# Patient Record
Sex: MALE | Race: WHITE
[De-identification: names, ages, dates, MRNs, and addresses within clinical notes are randomized per-mention and may not be internally consistent; named-entity substitution may affect disease eponyms.]

---

## 2020-12-17 ENCOUNTER — HOSPITAL ENCOUNTER (INPATIENT)
Dept: HOSPITAL 96 - M.ERS | Age: 60
LOS: 5 days | Discharge: HOME | DRG: 432 | End: 2020-12-22
Attending: INTERNAL MEDICINE | Admitting: INTERNAL MEDICINE
Payer: COMMERCIAL

## 2020-12-17 VITALS — SYSTOLIC BLOOD PRESSURE: 137 MMHG | DIASTOLIC BLOOD PRESSURE: 84 MMHG

## 2020-12-17 VITALS — DIASTOLIC BLOOD PRESSURE: 80 MMHG | SYSTOLIC BLOOD PRESSURE: 122 MMHG

## 2020-12-17 VITALS — SYSTOLIC BLOOD PRESSURE: 148 MMHG | DIASTOLIC BLOOD PRESSURE: 92 MMHG

## 2020-12-17 VITALS — SYSTOLIC BLOOD PRESSURE: 127 MMHG | DIASTOLIC BLOOD PRESSURE: 80 MMHG

## 2020-12-17 VITALS — DIASTOLIC BLOOD PRESSURE: 82 MMHG | SYSTOLIC BLOOD PRESSURE: 115 MMHG

## 2020-12-17 VITALS — HEIGHT: 62.01 IN | WEIGHT: 133.1 LBS | BODY MASS INDEX: 24.18 KG/M2

## 2020-12-17 DIAGNOSIS — E87.6: ICD-10-CM

## 2020-12-17 DIAGNOSIS — K74.69: ICD-10-CM

## 2020-12-17 DIAGNOSIS — K76.6: ICD-10-CM

## 2020-12-17 DIAGNOSIS — Z87.442: ICD-10-CM

## 2020-12-17 DIAGNOSIS — E80.6: ICD-10-CM

## 2020-12-17 DIAGNOSIS — E78.5: ICD-10-CM

## 2020-12-17 DIAGNOSIS — E83.42: ICD-10-CM

## 2020-12-17 DIAGNOSIS — E86.9: ICD-10-CM

## 2020-12-17 DIAGNOSIS — F17.210: ICD-10-CM

## 2020-12-17 DIAGNOSIS — E44.0: ICD-10-CM

## 2020-12-17 DIAGNOSIS — K83.1: ICD-10-CM

## 2020-12-17 DIAGNOSIS — K31.89: ICD-10-CM

## 2020-12-17 DIAGNOSIS — K70.10: Primary | ICD-10-CM

## 2020-12-17 DIAGNOSIS — Z20.828: ICD-10-CM

## 2020-12-17 DIAGNOSIS — Z79.899: ICD-10-CM

## 2020-12-17 LAB
ABSOLUTE BASOPHILS: 0.1 THOU/UL (ref 0–0.2)
ABSOLUTE EOSINOPHILS: 0.1 THOU/UL (ref 0–0.7)
ABSOLUTE MONOCYTES: 1.3 THOU/UL (ref 0–1.2)
ALBUMIN SERPL-MCNC: 2.9 G/DL (ref 3.4–5)
ALP SERPL-CCNC: 432 U/L (ref 46–116)
ALT SERPL-CCNC: 43 U/L (ref 30–65)
ANION GAP SERPL CALC-SCNC: 8 MMOL/L (ref 7–16)
AST SERPL-CCNC: 236 U/L (ref 15–37)
BASOPHILS NFR BLD AUTO: 0.7 %
BILIRUB SERPL-MCNC: 4.5 MG/DL
BILIRUB UR-MCNC: (no result) MG/DL
BUN SERPL-MCNC: 5 MG/DL (ref 7–18)
CALCIUM SERPL-MCNC: 8.4 MG/DL (ref 8.5–10.1)
CHLORIDE SERPL-SCNC: 100 MMOL/L (ref 98–107)
CO2 SERPL-SCNC: 30 MMOL/L (ref 21–32)
COLOR UR: YELLOW
CREAT SERPL-MCNC: 0.7 MG/DL (ref 0.6–1.3)
EOSINOPHIL NFR BLD: 1.1 %
GLUCOSE SERPL-MCNC: 116 MG/DL (ref 70–99)
GRANULOCYTES NFR BLD MANUAL: 70.8 %
HCT VFR BLD CALC: 40.6 % (ref 42–52)
HGB BLD-MCNC: 13.8 GM/DL (ref 14–18)
INR PPP: 1.2
KETONES UR STRIP-MCNC: NEGATIVE MG/DL
LIPASE: 74 U/L (ref 73–393)
LYMPHOCYTES # BLD: 1.7 THOU/UL (ref 0.8–5.3)
LYMPHOCYTES NFR BLD AUTO: 15.4 %
MAGNESIUM SERPL-MCNC: 1.4 MG/DL (ref 1.8–2.4)
MCH RBC QN AUTO: 38.1 PG (ref 26–34)
MCHC RBC AUTO-ENTMCNC: 34 G/DL (ref 28–37)
MCV RBC: 112.1 FL (ref 80–100)
MONOCYTES NFR BLD: 12 %
MPV: 8.7 FL. (ref 7.2–11.1)
NEUTROPHILS # BLD: 7.7 THOU/UL (ref 1.6–8.1)
NUCLEATED RBCS: 0 /100WBC
PLATELET COUNT*: 165 THOU/UL (ref 150–400)
POTASSIUM SERPL-SCNC: 3.1 MMOL/L (ref 3.5–5.1)
PROT SERPL-MCNC: 7.1 G/DL (ref 6.4–8.2)
PROT UR QL STRIP: (no result)
PROTHROMBIN TIME: 13 SECONDS (ref 9.2–11.5)
RBC # BLD AUTO: 3.62 MIL/UL (ref 4.5–6)
RBC # UR STRIP: NEGATIVE /UL
RDW-CV: 13.1 % (ref 10.5–14.5)
SODIUM SERPL-SCNC: 138 MMOL/L (ref 136–145)
SP GR UR STRIP: 1.01 (ref 1–1.03)
URINE CLARITY: CLEAR
URINE GLUCOSE-RANDOM: (no result)
URINE LEUKOCYTES-REFLEX: NEGATIVE
URINE NITRITE-REFLEX: NEGATIVE
UROBILINOGEN UR STRIP-ACNC: >= 8 E.U./DL (ref 0.2–1)
WBC # BLD AUTO: 10.8 THOU/UL (ref 4–11)

## 2020-12-17 NOTE — CON
[address]  [address]                    CONSULTATION                  
_______________________________________________________________________________
 
Name:       ESPERANZA CULVER                 Room:           [address] [place]  
.R.#:  [id number]      Account #:      [id number]  
Admission:  12/17/20     Attend Phys:    Sharif Whitley MD 
Discharge:               Date of Birth:  [birth year]  
         Report #: [id number]
                                                                     [id number]  
_______________________________________________________________________________
THIS REPORT FOR:  
 
cc:  FAM - Family physician unknown
     FAM - Family physician unknown                                       ~
     Zabrina Alonzo MD             
 
 
CONSULTING PHYSICIAN:  Zabrina Alonzo MD
 
The patient has no regular primary care physician.  The patient is being seen
today for right upper quadrant pain and elevated liver function tests.  The
patient personally seen and examined by me.
 
HISTORY OF PRESENT ILLNESS:  This is a 60-year-old male who denies any past
medical history who presented to our Emergency Department with complaints of
worsening abdominal pain, diarrhea, and abdominal distention over the last 5 to
7 days without vomiting.  The patient reported that his pain was as high as
10/10.  He denies any associated constipation.  He does have a history of heavy
drinking and reports that he has for years had about 5 heavily mixed liquor
drinks daily and within the last 2 months has reduced it down to 2 drinks daily.
 He denies any alcohol use over the last 4 days.  He reports that he has normal
bowels are soft formed and brown that occur daily.
 
PAST MEDICAL HISTORY:  Kidney stones removed in 2002.  He has never had an EGD
or colonoscopy before.
 
FAMILY HISTORY:  No significant family history.
 
SOCIAL HISTORY:  Current 1 pack a day smoker.  History of heavy drinking 5
drinks daily for years and recently within the last 2 months, decreased those
drinks to 2 daily.  He has not had anything to drink in 4-5 days.  He denies
illicit drug use.
 
MEDICATIONS:  He takes no current medications.
 
PHYSICAL EXAMINATION
LUNGS:  Clear bilaterally to auscultation with regular rate and work of breath.
CARDIAC:  He has normal sinus rhythm on telemetry monitor, has no murmurs, 2+
dorsalis pedis pulses, 2+ radial pulses.
ABDOMEN:  Soft, but distended, tenderness upon palpation of the right upper
quadrant.  Positive bowel sounds.  
SKIN:  Difficult to assess for jaundice.
EXTREMITIES:  Mild scleral icterus bilaterally.
 
IMAGING:  Ultrasound of the abdomen revealed ascites, hepatomegaly, hepatic
steatosis, mildly distended gallbladder with mural thickening and no ductal
 
 
 
[place] [address]                    CONSULTATION                  
_______________________________________________________________________________
 
Name:       ESPERANZA CULVER                 Room:           [address]#:  [id number]      Account #:      [id number]  
Admission:  12/17/20     Attend Phys:    Sharif Whitley MD 
Discharge:               Date of Birth:  [birth year]  
         Report #: [id number]
                                                                     [id number]  
_______________________________________________________________________________
 
 
dilatation.
 
LABORATORY DATA:  Bilirubin 3.4, , ALT 35, alkaline phosphatase 337. 
White blood cell count 8.3, hemoglobin 12.4, platelet 135, albumin 2.3, BUN 6,
creatinine 0.6, .  INR is 1.2, GGTP 1545.  Hepatic panel pending.
 
ASSESSMENT:
1.  Right upper quadrant abdominal pain.
2.  Acute hepatitis.
3.  Hyperbilirubinemia.
4.  History of heavy ETOH use.
 
PLAN:
1.  EGD today.
2.  Paracentesis today.
3.  MRCP today.
4.  Await results of acute hepatitis panel.
5.  Trend labs for liver function tests.
 
Thank you for allowing us to participate in the consultation on this delightful
60-year-old male.  We will await results of the paracentesis, EGD, MRCP, acute
hepatitis panel and make further recommendations based upon those results. 
Please feel free to contact us for any other needs or questions about this
consultation.
 
 
 
 
 
 
 
 
 
 
 
 
 
 
 
 
 
 
                       
                                        By:                                
                 
D: 12/18/20 0908_______________________________________
T: 12/18/20 0948Zabrina Alonzo MD               /nt

## 2020-12-18 VITALS — DIASTOLIC BLOOD PRESSURE: 75 MMHG | SYSTOLIC BLOOD PRESSURE: 111 MMHG

## 2020-12-18 VITALS — DIASTOLIC BLOOD PRESSURE: 85 MMHG | SYSTOLIC BLOOD PRESSURE: 132 MMHG

## 2020-12-18 VITALS — DIASTOLIC BLOOD PRESSURE: 78 MMHG | SYSTOLIC BLOOD PRESSURE: 135 MMHG

## 2020-12-18 VITALS — SYSTOLIC BLOOD PRESSURE: 131 MMHG | DIASTOLIC BLOOD PRESSURE: 79 MMHG

## 2020-12-18 VITALS — SYSTOLIC BLOOD PRESSURE: 124 MMHG | DIASTOLIC BLOOD PRESSURE: 69 MMHG

## 2020-12-18 LAB
ALBUMIN SERPL-MCNC: 2.3 G/DL (ref 3.4–5)
ALP SERPL-CCNC: 337 U/L (ref 46–116)
ALT SERPL-CCNC: 35 U/L (ref 30–65)
ANION GAP SERPL CALC-SCNC: 3 MMOL/L (ref 7–16)
AST SERPL-CCNC: 160 U/L (ref 15–37)
BF LYMPHOCYTES: 94 %
BF MONOCYTES: 0 %
BF POLYS: 6 %
BF RBC: <1000 /MM3
BF TISSUE: 94 /100 WBC
BILIRUB SERPL-MCNC: 3.4 MG/DL
BUN SERPL-MCNC: 6 MG/DL (ref 7–18)
CALCIUM SERPL-MCNC: 7.5 MG/DL (ref 8.5–10.1)
CHLORIDE SERPL-SCNC: 107 MMOL/L (ref 98–107)
CLARITY UR: (no result)
CO2 SERPL-SCNC: 28 MMOL/L (ref 21–32)
CREAT SERPL-MCNC: 0.6 MG/DL (ref 0.6–1.3)
GLUCOSE SERPL-MCNC: 76 MG/DL (ref 70–99)
HCT VFR BLD CALC: 36.8 % (ref 42–52)
HGB BLD-MCNC: 12.4 GM/DL (ref 14–18)
MAGNESIUM SERPL-MCNC: 1.4 MG/DL (ref 1.8–2.4)
MCH RBC QN AUTO: 38.8 PG (ref 26–34)
MCHC RBC AUTO-ENTMCNC: 33.8 G/DL (ref 28–37)
MCV RBC: 114.5 FL (ref 80–100)
MPV: 9 FL. (ref 7.2–11.1)
PLATELET COUNT*: 135 THOU/UL (ref 150–400)
POTASSIUM SERPL-SCNC: 4.6 MMOL/L (ref 3.5–5.1)
PROT SERPL-MCNC: 5.8 G/DL (ref 6.4–8.2)
RBC # BLD AUTO: 3.21 MIL/UL (ref 4.5–6)
RDW-CV: 13.1 % (ref 10.5–14.5)
SODIUM SERPL-SCNC: 138 MMOL/L (ref 136–145)
SOURCE: (no result)
SPECIMEN VOL 24H UR: 1700 ML
TOTAL CELL COUNT: 225 /MM3
WBC # BLD AUTO: 8.3 THOU/UL (ref 4–11)

## 2020-12-18 PROCEDURE — 0DJ08ZZ INSPECTION OF UPPER INTESTINAL TRACT, VIA NATURAL OR ARTIFICIAL OPENING ENDOSCOPIC: ICD-10-PCS | Performed by: INTERNAL MEDICINE

## 2020-12-18 PROCEDURE — 0W9G3ZZ DRAINAGE OF PERITONEAL CAVITY, PERCUTANEOUS APPROACH: ICD-10-PCS

## 2020-12-19 VITALS — DIASTOLIC BLOOD PRESSURE: 73 MMHG | SYSTOLIC BLOOD PRESSURE: 112 MMHG

## 2020-12-19 VITALS — DIASTOLIC BLOOD PRESSURE: 77 MMHG | SYSTOLIC BLOOD PRESSURE: 108 MMHG

## 2020-12-19 VITALS — DIASTOLIC BLOOD PRESSURE: 82 MMHG | SYSTOLIC BLOOD PRESSURE: 128 MMHG

## 2020-12-19 VITALS — DIASTOLIC BLOOD PRESSURE: 58 MMHG | SYSTOLIC BLOOD PRESSURE: 105 MMHG

## 2020-12-19 LAB
ALBUMIN SERPL-MCNC: 2.6 G/DL (ref 3.4–5)
ALP SERPL-CCNC: 313 U/L (ref 46–116)
ALT SERPL-CCNC: 34 U/L (ref 30–65)
ANION GAP SERPL CALC-SCNC: 5 MMOL/L (ref 7–16)
AST SERPL-CCNC: 121 U/L (ref 15–37)
BILIRUB SERPL-MCNC: 3 MG/DL
BODY FLUID LDH: 75 IU/L
BUN SERPL-MCNC: 8 MG/DL (ref 7–18)
CALCIUM SERPL-MCNC: 7.7 MG/DL (ref 8.5–10.1)
CHLORIDE SERPL-SCNC: 103 MMOL/L (ref 98–107)
CO2 SERPL-SCNC: 28 MMOL/L (ref 21–32)
CREAT SERPL-MCNC: 0.6 MG/DL (ref 0.6–1.3)
GLUCOSE SERPL-MCNC: 99 MG/DL (ref 70–99)
HCT VFR BLD CALC: 38 % (ref 42–52)
HGB BLD-MCNC: 13 GM/DL (ref 14–18)
MAGNESIUM SERPL-MCNC: 1.7 MG/DL (ref 1.8–2.4)
MCH RBC QN AUTO: 38.8 PG (ref 26–34)
MCHC RBC AUTO-ENTMCNC: 34.1 G/DL (ref 28–37)
MCV RBC: 113.7 FL (ref 80–100)
MPV: 9.3 FL. (ref 7.2–11.1)
PLATELET COUNT*: 138 THOU/UL (ref 150–400)
POTASSIUM SERPL-SCNC: 4 MMOL/L (ref 3.5–5.1)
PROT FLD-MCNC: 2.4 G/DL
PROT SERPL-MCNC: 6 G/DL (ref 6.4–8.2)
RBC # BLD AUTO: 3.34 MIL/UL (ref 4.5–6)
RDW-CV: 13 % (ref 10.5–14.5)
SODIUM SERPL-SCNC: 136 MMOL/L (ref 136–145)
WBC # BLD AUTO: 9.3 THOU/UL (ref 4–11)

## 2020-12-20 VITALS — DIASTOLIC BLOOD PRESSURE: 79 MMHG | SYSTOLIC BLOOD PRESSURE: 121 MMHG

## 2020-12-20 VITALS — SYSTOLIC BLOOD PRESSURE: 113 MMHG | DIASTOLIC BLOOD PRESSURE: 69 MMHG

## 2020-12-20 VITALS — SYSTOLIC BLOOD PRESSURE: 103 MMHG | DIASTOLIC BLOOD PRESSURE: 63 MMHG

## 2020-12-20 VITALS — DIASTOLIC BLOOD PRESSURE: 60 MMHG | SYSTOLIC BLOOD PRESSURE: 109 MMHG

## 2020-12-20 LAB
ALBUMIN SERPL-MCNC: 2.4 G/DL (ref 3.4–5)
ALP SERPL-CCNC: 275 U/L (ref 46–116)
ALT SERPL-CCNC: 31 U/L (ref 30–65)
ANION GAP SERPL CALC-SCNC: 9 MMOL/L (ref 7–16)
AST SERPL-CCNC: 107 U/L (ref 15–37)
BILIRUB SERPL-MCNC: 2.3 MG/DL
BUN SERPL-MCNC: 11 MG/DL (ref 7–18)
CALCIUM SERPL-MCNC: 7.2 MG/DL (ref 8.5–10.1)
CHLORIDE SERPL-SCNC: 103 MMOL/L (ref 98–107)
CO2 SERPL-SCNC: 27 MMOL/L (ref 21–32)
CREAT SERPL-MCNC: 0.6 MG/DL (ref 0.6–1.3)
GLUCOSE SERPL-MCNC: 80 MG/DL (ref 70–99)
INR PPP: 1.3
MAGNESIUM SERPL-MCNC: 1.7 MG/DL (ref 1.8–2.4)
POTASSIUM SERPL-SCNC: 3.5 MMOL/L (ref 3.5–5.1)
PROT SERPL-MCNC: 5.7 G/DL (ref 6.4–8.2)
PROTHROMBIN TIME: 14.1 SECONDS (ref 9.2–11.5)
SODIUM SERPL-SCNC: 139 MMOL/L (ref 136–145)

## 2020-12-21 VITALS — DIASTOLIC BLOOD PRESSURE: 74 MMHG | SYSTOLIC BLOOD PRESSURE: 115 MMHG

## 2020-12-21 VITALS — SYSTOLIC BLOOD PRESSURE: 101 MMHG | DIASTOLIC BLOOD PRESSURE: 60 MMHG

## 2020-12-21 VITALS — SYSTOLIC BLOOD PRESSURE: 115 MMHG | DIASTOLIC BLOOD PRESSURE: 68 MMHG

## 2020-12-21 LAB
ABSOLUTE BASOPHILS: 0.1 THOU/UL (ref 0–0.2)
ABSOLUTE EOSINOPHILS: 0.2 THOU/UL (ref 0–0.7)
ABSOLUTE MONOCYTES: 1.3 THOU/UL (ref 0–1.2)
ALBUMIN SERPL-MCNC: 2.7 G/DL (ref 3.4–5)
ALP SERPL-CCNC: 294 U/L (ref 46–116)
ALT SERPL-CCNC: 36 U/L (ref 30–65)
ANION GAP SERPL CALC-SCNC: 7 MMOL/L (ref 7–16)
AST SERPL-CCNC: 116 U/L (ref 15–37)
BASOPHILS NFR BLD AUTO: 1.2 %
BILIRUB SERPL-MCNC: 2.9 MG/DL
BUN SERPL-MCNC: 13 MG/DL (ref 7–18)
CALCIUM SERPL-MCNC: 8.2 MG/DL (ref 8.5–10.1)
CHLORIDE SERPL-SCNC: 103 MMOL/L (ref 98–107)
CO2 SERPL-SCNC: 29 MMOL/L (ref 21–32)
CREAT SERPL-MCNC: 0.7 MG/DL (ref 0.6–1.3)
EOSINOPHIL NFR BLD: 2.4 %
ESR (SEDRATE): 19 MM/HR (ref 0–20)
GLUCOSE SERPL-MCNC: 89 MG/DL (ref 70–99)
GRANULOCYTES NFR BLD MANUAL: 64.1 %
HCT VFR BLD CALC: 39.4 % (ref 42–52)
HGB BLD-MCNC: 13.4 GM/DL (ref 14–18)
LYMPHOCYTES # BLD: 1.5 THOU/UL (ref 0.8–5.3)
LYMPHOCYTES NFR BLD AUTO: 16.8 %
MACROCYTES: (no result)
MAGNESIUM SERPL-MCNC: 2 MG/DL (ref 1.8–2.4)
MCH RBC QN AUTO: 38.4 PG (ref 26–34)
MCHC RBC AUTO-ENTMCNC: 34.1 G/DL (ref 28–37)
MCV RBC: 112.6 FL (ref 80–100)
MONOCYTES NFR BLD: 15.5 %
MPV: 8.9 FL. (ref 7.2–11.1)
NEUTROPHILS # BLD: 5.6 THOU/UL (ref 1.6–8.1)
NUCLEATED RBCS: 0 /100WBC
PLATELET # BLD EST: ADEQUATE 10*3/UL
PLATELET COUNT*: 165 THOU/UL (ref 150–400)
POTASSIUM SERPL-SCNC: 3.8 MMOL/L (ref 3.5–5.1)
PROT SERPL-MCNC: 6.4 G/DL (ref 6.4–8.2)
RBC # BLD AUTO: 3.49 MIL/UL (ref 4.5–6)
RDW-CV: 13.3 % (ref 10.5–14.5)
SODIUM SERPL-SCNC: 139 MMOL/L (ref 136–145)
WBC # BLD AUTO: 8.7 THOU/UL (ref 4–11)

## 2020-12-22 VITALS — SYSTOLIC BLOOD PRESSURE: 101 MMHG | DIASTOLIC BLOOD PRESSURE: 72 MMHG

## 2020-12-22 VITALS — DIASTOLIC BLOOD PRESSURE: 72 MMHG | SYSTOLIC BLOOD PRESSURE: 101 MMHG

## 2020-12-23 LAB
CMV IGM ABS: <30 AU/ML (ref 0–29.9)
EBV NA IGG SER QL IA: >600 U/ML (ref 0–17.9)
EBV VCA IGM SER QL IA: <36 U/ML (ref 0–35.9)

## 2020-12-23 NOTE — PATH
[address]  [address]                    PATHOLOGY RPT PROCEDURE       
_______________________________________________________________________________
 
Name:       ESPERANZA CULVER                 Room:           [address] [place]  
[facility]#:  [id number]      Account #:      [id number]  
Admission:  12/17/20     Date of Birth:  [birth year]  
Discharge:  12/22/20                   Report #:    [id number]
                                                         Path Case #: [id number]
_______________________________________________________________________________
Note
[facility] Accession Number: [id number]
   TESTS               RESULT  FLAG  UNITS    REF RANGE  LAB
------------------------------------------------------------
   Clinician Provided Cytology Information
   No. of containers..01 Other (Miscellaneous)
Source:                                                   01
   ASCITES
DIAGNOSIS:                                                01
   ASCITES
   NEGATIVE FOR MALIGNANT CELLS.
   REACTIVE MESOTHELIAL CELLS ARE PRESENT.
   THIS INTERPRETATION INCLUDES EVALUATION OF A CELL BLOCK.
   COMMENT,
   SUGGEST CLINICAL CORRELATION
Signed out by:                                            01
   Mio Mauricio MD, Pathologist
   NPI- [id number]
Performed by:                                             01
   Kimi Hart, Cytotechnologist ([facility])
Gross description:                                        01
   14ML, CLEAR YELLOW, 1 TP 1 CB
   /LCS  12/21/2020  1129 Local
 
------------------------------------------------------------
    FLAG LEGEND:
    L-Low Normal,H-High Normal,LL-Alert Low,HH-Alert High
    <-Panic Low,>-Panic High,A-Abnormal,AA-Critical Abnormal
------------------------------------------------------------
 
Performed at:
[address]
   [place]  [address]
   Mio Mauricio MD, Phone: [phone]
Specimen Comment: A courtesy copy of this report has been sent to [phone]
[phone]
Specimen Comment: [phone]
Specimen Comment: Report sent to 
***Performed at:  01
   [address], [place], [place]  [id number]
   MD Mio Mauricio MD Phone:  [phone]